# Patient Record
Sex: MALE | Race: ASIAN | NOT HISPANIC OR LATINO | Employment: FULL TIME | URBAN - METROPOLITAN AREA
[De-identification: names, ages, dates, MRNs, and addresses within clinical notes are randomized per-mention and may not be internally consistent; named-entity substitution may affect disease eponyms.]

---

## 2018-12-26 ENCOUNTER — OFFICE VISIT (OUTPATIENT)
Dept: URGENT CARE | Facility: CLINIC | Age: 23
End: 2018-12-26
Payer: COMMERCIAL

## 2018-12-26 VITALS
SYSTOLIC BLOOD PRESSURE: 132 MMHG | HEART RATE: 110 BPM | WEIGHT: 148 LBS | HEIGHT: 71 IN | DIASTOLIC BLOOD PRESSURE: 78 MMHG | TEMPERATURE: 98 F | OXYGEN SATURATION: 97 % | RESPIRATION RATE: 16 BRPM | BODY MASS INDEX: 20.72 KG/M2

## 2018-12-26 DIAGNOSIS — S99.921A INJURY OF RIGHT FOOT, INITIAL ENCOUNTER: Primary | ICD-10-CM

## 2018-12-26 DIAGNOSIS — S92.344A CLOSED NONDISPLACED FRACTURE OF FOURTH METATARSAL BONE OF RIGHT FOOT, INITIAL ENCOUNTER: ICD-10-CM

## 2018-12-26 DIAGNOSIS — S92.334A CLOSED NONDISPLACED FRACTURE OF THIRD METATARSAL BONE OF RIGHT FOOT, INITIAL ENCOUNTER: ICD-10-CM

## 2018-12-26 PROCEDURE — 73630 X-RAY EXAM OF FOOT: CPT | Mod: FY,RT,S$GLB, | Performed by: RADIOLOGY

## 2018-12-26 PROCEDURE — 3008F BODY MASS INDEX DOCD: CPT | Mod: CPTII,S$GLB,, | Performed by: NURSE PRACTITIONER

## 2018-12-26 PROCEDURE — 99204 OFFICE O/P NEW MOD 45 MIN: CPT | Mod: S$GLB,,, | Performed by: NURSE PRACTITIONER

## 2018-12-26 NOTE — PROGRESS NOTES
"Subjective:       Patient ID: Unique De Santiago is a 23 y.o. male.    Vitals:  height is 5' 11" (1.803 m) and weight is 67.1 kg (148 lb). His tympanic temperature is 98.3 °F (36.8 °C). His blood pressure is 132/78 and his pulse is 110. His respiration is 16 and oxygen saturation is 97%.     Chief Complaint: Foot Injury    Foot Injury    The incident occurred 3 to 5 days ago. The incident occurred in the street. The injury mechanism was a fall. The pain is present in the right foot. The quality of the pain is described as aching. The pain is at a severity of 8/10. The pain is severe. The pain has been constant since onset. Associated symptoms include an inability to bear weight. Pertinent negatives include no loss of motion, loss of sensation, muscle weakness, numbness or tingling. He reports no foreign bodies present. The symptoms are aggravated by movement, palpation and weight bearing. He has tried ice for the symptoms.       Constitution: Negative for fatigue.   HENT: Negative for facial swelling and facial trauma.    Neck: Negative for neck stiffness.   Cardiovascular: Negative for chest trauma.   Eyes: Negative for eye trauma, double vision and blurred vision.   Gastrointestinal: Negative for abdominal trauma, abdominal pain and rectal bleeding.   Genitourinary: Negative for hematuria, genital trauma and pelvic pain.   Musculoskeletal: Positive for pain, trauma, joint pain and joint swelling. Negative for abnormal ROM of joint and pain with walking.   Skin: Negative for color change, wound, abrasion and laceration.   Neurological: Negative for dizziness, history of vertigo, light-headedness, coordination disturbances, altered mental status, loss of consciousness and numbness.   Hematologic/Lymphatic: Negative for history of bleeding disorder.   Psychiatric/Behavioral: Negative for altered mental status.       Objective:      Physical Exam   Constitutional: He is oriented to person, place, and time. Vital signs " are normal. He appears well-developed and well-nourished. He is cooperative.  Non-toxic appearance. He does not have a sickly appearance. He does not appear ill. No distress.   HENT:   Head: Normocephalic and atraumatic.   Right Ear: Hearing, external ear and ear canal normal. Tympanic membrane is not bulging.   Left Ear: Hearing, external ear and ear canal normal. Tympanic membrane is not bulging.   Nose: Nose normal. No mucosal edema, rhinorrhea or nasal deformity. No epistaxis. Right sinus exhibits no maxillary sinus tenderness and no frontal sinus tenderness. Left sinus exhibits no maxillary sinus tenderness and no frontal sinus tenderness.   Mouth/Throat: Uvula is midline, oropharynx is clear and moist and mucous membranes are normal. No trismus in the jaw. Normal dentition. No uvula swelling. No posterior oropharyngeal erythema.   Eyes: Conjunctivae, EOM and lids are normal. Pupils are equal, round, and reactive to light. Right eye exhibits no discharge. Left eye exhibits no discharge. No scleral icterus.   Sclera clear bilat   Neck: Trachea normal, normal range of motion, full passive range of motion without pain and phonation normal. Neck supple.   Cardiovascular: Normal rate, regular rhythm, S1 normal, S2 normal, normal heart sounds, intact distal pulses and normal pulses.   Pulmonary/Chest: Effort normal and breath sounds normal. No respiratory distress. He has no decreased breath sounds. He has no wheezes. He has no rhonchi. He has no rales.   Abdominal: Soft. Normal appearance and bowel sounds are normal. He exhibits no distension, no pulsatile midline mass and no mass. There is no tenderness.   Musculoskeletal: He exhibits no edema or deformity.        Right foot: There is decreased range of motion (Decreased ROM secondary to pain and swelling ), tenderness, bony tenderness and swelling. There is normal capillary refill, no crepitus, no deformity and no laceration.        Feet:    Patient has limp to  the right side of the affected foot; See attached picture   Neurological: He is alert and oriented to person, place, and time. He exhibits normal muscle tone. Coordination normal.   Skin: Skin is warm, dry and intact. No rash noted. He is not diaphoretic. No pallor.   Psychiatric: He has a normal mood and affect. His speech is normal and behavior is normal. Judgment and thought content normal. Cognition and memory are normal.   Nursing note and vitals reviewed.      Type of Interpretation: Radiology Verbal Report.  Radiology Procedure Done: Right Foot.  Interpretation: XR FOOT COMPLETE 3 VIEW RIGHT   Order: [id number]   Status:  Final result   Visible to patient:  No (Not Released)   Next appt:  None   Dx:  Injury of right foot, initial encounter   Details       Reading Physician Reading Date Result Priority  Clovis Damon MD 12/26/2018     Narrative    EXAMINATION:  XR FOOT COMPLETE 3 VIEW RIGHT    CLINICAL HISTORY:  . Unspecified injury of right foot, initial encounter    TECHNIQUE:  AP, lateral, and oblique views of the right foot were performed.    COMPARISON:  None    FINDINGS:  The proximal ends of the 3rd and 4th metatarsals show recent fractures without significant displacement.  Tarsal-metatarsal articulations look aligned.  No dislocation or other fracture is detected.  Joint spaces are normal.  Soft tissue swelling is present in the midfoot.    Impression      Right foot 3rd and 4th metatarsal fractures    This report was flagged in Epic as abnormal.      Electronically signed by: Clovis Damon MD  Date: 12/26/2018  Time: 14:41                  Assessment:       1. Injury of right foot, initial encounter    2. Closed nondisplaced fracture of third metatarsal bone of right foot, initial encounter    3. Closed nondisplaced fracture of fourth metatarsal bone of right foot, initial encounter        Plan:         Injury of right foot, initial encounter  -     XR FOOT COMPLETE 3 VIEW RIGHT; Future;  Expected date: 12/26/2018    Closed nondisplaced fracture of third metatarsal bone of right foot, initial encounter  -     AIR CAST WALKER BOOT FOR HOME USE  -     Ambulatory referral to Orthopedics    Closed nondisplaced fracture of fourth metatarsal bone of right foot, initial encounter  -     AIR CAST WALKER BOOT FOR HOME USE  -     Ambulatory referral to Orthopedics      Patient Instructions                                 Orthopedic Fracture Discharge Instructions  If your condition worsens or fails to improve we recommend that you receive another evaluation at the ER immediately or contact your PCP to discuss your concerns or return here. You must understand that you've received an urgent care treatment only and that you may be released before all your medical problems are known or treated. You the patient will arrange for follouw care as instructed.   Follow up with your Orthopedist within the next 48-72hrs for more evaluation and management of your condition.    If you were prescribed a narcotic or muscle relaxant do not drive or operate heavy machinery while taking these medications   Tylenol can also be used as directed for pain unless you have an allergy to them or medical condition such as stomach ulcers, kidney or liver disease or blood thinners etc for which you should not be taking these type of medications.   Avoid NSAIDs like Ibuprofen, Aleve, Advil, Motrin, etc.; as they can cause a delay in healing of the bone.    RICE which means rest, ice compression and elevation are helpful.   If you were given a splint wear it at all times.   If you were given crutches use them as we instructed. Do not rest your armpits on the foam pad or you risk compressing the nerves and the vessels there   You may take over the counter Calcium 1000 mg daily and Vitamin D3 600 IU daily for 4-6 weeks to help with the bone healing process. If you are not allergic or do not have any contraindications.     General Referral to  [facility]  You were referred to [facility] to Establish Care and Management of your condition.  Please call [phone] to schedule your appointment.    Please return here or go to the Emergency Department for any concerns or worsening of condition.  Please follow up with your primary care doctor or specialist in the next 48-72hrs as needed.    If you  smoke, please stop smoking.      Foot Fracture  You have a broken bone (fracture) in your foot. This will cause pain, swelling, and often bruising. It will usually take about 4 to 8 weeks to heal. A foot fracture may be treated with a special shoe, splint, cast, or boot.  Home care  Follow these guidelines when caring for yourself at home:  · You may be given a splint, cast, shoe, or boot to keep the injured area from moving. Unless you were told otherwise, use crutches or a walker. Dont put weight on the injured foot until your health care provider says you can do so. (You can rent crutches and a walker at many pharmacies and surgical or orthopedic supply stores.) Dont put weight on a splint, or it will break.  · Keep your leg elevated to reduce pain and swelling. When sleeping, put a pillow under the injured leg. When sitting, support the injured leg so it is above your waist. This is very important during the first 2 days (48 hours).  · Put an ice pack on the injured area. Do this for 20 minutes every 1 to 2 hours the first day for pain relief. You can make an ice pack by wrapping a plastic bag of ice cubes in a thin towel. As the ice melts, be careful that the splint, cast, boot, or shoe doesnt get wet. You can place the ice pack directly over the splint or cast. Unless told otherwise, you can open the boot or shoe to apply the ice pack. Continue using the ice pack 3 to 4 times a day for the next 2 days. Then use the ice pack as needed to ease pain and swelling.  · Keep the splint, cast, boot, or shoe dry. When bathing, protect it with a  large plastic bag, rubber-banded at the top end. If a fiberglass splint or cast or boot gets wet, you can dry it with a hair dryer. Unless told otherwise, you can take off the boot or shoe to bathe.  · You may use acetaminophen or ibuprofen to control pain, unless another pain medicine was prescribed. If you have chronic liver or kidney disease, talk with your healthcare provider before using these medicines. Also talk with your provider if youve had a stomach ulcer or gastrointestinal bleeding.  · Dont put creams or objects under the cast if you have itching.  Follow-up care  Follow up with your healthcare provider, or as advised. This is to make sure the bone is healing the way it should. If you were given a splint, it may be changed to a cast or boot at your follow-up visit.  X-rays may be taken. You will be told of any new findings that may affect your care.  When to seek medical advice  Call your healthcare provider right away if any of these occur:  · The cast or splint cracks  · The plaster cast or splint becomes wet or soft  · The fiberglass cast or splint stays wet for more than 24 hours  · Bad odor from the cast or wound fluid stains the cast  · Tightness or pain under the cast or splint gets worse  · Toes become swollen, cold, blue, numb, or tingly  · You cant move your toes  · Skin around cast or splint becomes red  · Fever of 100.4ºF (38ºC) or higher, or as directed by your healthcare provider  Date Last Reviewed: 2/1/2017 © 2000-2017 [employer]. [address] [place] [address], [place] [address]. All rights reserved. This information is not intended as a substitute for professional medical care. Always follow your healthcare professional's instructions.

## 2018-12-26 NOTE — PATIENT INSTRUCTIONS
Orthopedic Fracture Discharge Instructions  If your condition worsens or fails to improve we recommend that you receive another evaluation at the ER immediately or contact your PCP to discuss your concerns or return here. You must understand that you've received an urgent care treatment only and that you may be released before all your medical problems are known or treated. You the patient will arrange for follouwp care as instructed.   Follow up with your Orthopedist within the next 48-72hrs for more evaluation and management of your condition.    If you were prescribed a narcotic or muscle relaxant do not drive or operate heavy machinery while taking these medications   Tylenol can also be used as directed for pain unless you have an allergy to them or medical condition such as stomach ulcers, kidney or liver disease or blood thinners etc for which you should not be taking these type of medications.   Avoid NSAIDs like Ibuprofen, Aleve, Advil, Motrin, etc.; as they can cause a delay in healing of the bone.    RICE which means rest, ice compression and elevation are helpful.   If you were given a splint wear it at all times.   If you were given crutches use them as we instructed. Do not rest your armpits on the foam pad or you risk compressing the nerves and the vessels there   You may take over the counter Calcium 1000 mg daily and Vitamin D3 600 IU daily for 4-6 weeks to help with the bone healing process. If you are not allergic or do not have any contraindications.     General Referral to [facility]  You were referred to [facility] to Establish Care and Management of your condition.  Please call [phone] to schedule your appointment.    Please return here or go to the Emergency Department for any concerns or worsening of condition.  Please follow up with your primary care doctor or specialist in the next 48-72hrs as needed.    If you  smoke, please stop  smoking.      Foot Fracture  You have a broken bone (fracture) in your foot. This will cause pain, swelling, and often bruising. It will usually take about 4 to 8 weeks to heal. A foot fracture may be treated with a special shoe, splint, cast, or boot.  Home care  Follow these guidelines when caring for yourself at home:  · You may be given a splint, cast, shoe, or boot to keep the injured area from moving. Unless you were told otherwise, use crutches or a walker. Dont put weight on the injured foot until your health care provider says you can do so. (You can rent crutches and a walker at many pharmacies and surgical or orthopedic supply stores.) Dont put weight on a splint, or it will break.  · Keep your leg elevated to reduce pain and swelling. When sleeping, put a pillow under the injured leg. When sitting, support the injured leg so it is above your waist. This is very important during the first 2 days (48 hours).  · Put an ice pack on the injured area. Do this for 20 minutes every 1 to 2 hours the first day for pain relief. You can make an ice pack by wrapping a plastic bag of ice cubes in a thin towel. As the ice melts, be careful that the splint, cast, boot, or shoe doesnt get wet. You can place the ice pack directly over the splint or cast. Unless told otherwise, you can open the boot or shoe to apply the ice pack. Continue using the ice pack 3 to 4 times a day for the next 2 days. Then use the ice pack as needed to ease pain and swelling.  · Keep the splint, cast, boot, or shoe dry. When bathing, protect it with a large plastic bag, rubber-banded at the top end. If a fiberglass splint or cast or boot gets wet, you can dry it with a hair dryer. Unless told otherwise, you can take off the boot or shoe to bathe.  · You may use acetaminophen or ibuprofen to control pain, unless another pain medicine was prescribed. If you have chronic liver or kidney disease, talk with your healthcare provider before using  these medicines. Also talk with your provider if youve had a stomach ulcer or gastrointestinal bleeding.  · Dont put creams or objects under the cast if you have itching.  Follow-up care  Follow up with your healthcare provider, or as advised. This is to make sure the bone is healing the way it should. If you were given a splint, it may be changed to a cast or boot at your follow-up visit.  X-rays may be taken. You will be told of any new findings that may affect your care.  When to seek medical advice  Call your healthcare provider right away if any of these occur:  · The cast or splint cracks  · The plaster cast or splint becomes wet or soft  · The fiberglass cast or splint stays wet for more than 24 hours  · Bad odor from the cast or wound fluid stains the cast  · Tightness or pain under the cast or splint gets worse  · Toes become swollen, cold, blue, numb, or tingly  · You cant move your toes  · Skin around cast or splint becomes red  · Fever of 100.4ºF (38ºC) or higher, or as directed by your healthcare provider  Date Last Reviewed: 2/1/2017  © 2137-6975 [employer]. [address] [place] [address], [place] [address]. All rights reserved. This information is not intended as a substitute for professional medical care. Always follow your healthcare professional's instructions.

## 2018-12-27 ENCOUNTER — TELEPHONE (OUTPATIENT)
Dept: PODIATRY | Facility: CLINIC | Age: 23
End: 2018-12-27

## 2018-12-27 NOTE — TELEPHONE ENCOUNTER
----- Message from Rochelle Gaxiola sent at 12/27/2018 12:54 PM CST -----  Contact: Self  Ms. Vianney, the pt was seen in the ER for two fractured toes and referred to Ortho.  I was unable to find availability for anytime soon, so I'm hoping you will be successful.  He is requesting a returned call for scheduling.    He can be a reached at [phone].    Thank you

## 2018-12-27 NOTE — TELEPHONE ENCOUNTER
Ortho Telephone Triage Message  [phone]  Pt requests Ortho appt for R foot fracture seen per ANA Balderrama NP/[facility]  referral. Pt will be returning to [facility] on 1/1/19.  HX: Closed nondisplaced fracture of R 3rd/4th metatarsal 12/26/18  Triage Advice: Advised pt that appt is available tomorrow in Podiatry. Advised that pt follow  AVS until seen.   Resolution:Pt states understanding. Appt scheduled tomorrow  with Dr. Graham/Podiatry at 10:15am with arrival at 10:00am. Pt confirms time and location of appt.

## 2018-12-28 ENCOUNTER — OFFICE VISIT (OUTPATIENT)
Dept: PODIATRY | Facility: CLINIC | Age: 23
End: 2018-12-28
Payer: COMMERCIAL

## 2018-12-28 VITALS
BODY MASS INDEX: 20.72 KG/M2 | HEIGHT: 71 IN | WEIGHT: 148 LBS | DIASTOLIC BLOOD PRESSURE: 86 MMHG | SYSTOLIC BLOOD PRESSURE: 135 MMHG | HEART RATE: 101 BPM

## 2018-12-28 DIAGNOSIS — M79.671 FOOT PAIN, RIGHT: ICD-10-CM

## 2018-12-28 DIAGNOSIS — S93.621A LISFRANC'S SPRAIN, RIGHT, INITIAL ENCOUNTER: ICD-10-CM

## 2018-12-28 DIAGNOSIS — M25.474 SWELLING OF FOOT JOINT, RIGHT: ICD-10-CM

## 2018-12-28 DIAGNOSIS — S92.344A CLOSED NONDISPLACED FRACTURE OF FOURTH METATARSAL BONE OF RIGHT FOOT, INITIAL ENCOUNTER: Primary | ICD-10-CM

## 2018-12-28 DIAGNOSIS — S92.334A CLOSED NONDISPLACED FRACTURE OF THIRD METATARSAL BONE OF RIGHT FOOT, INITIAL ENCOUNTER: ICD-10-CM

## 2018-12-28 PROCEDURE — 3008F BODY MASS INDEX DOCD: CPT | Mod: CPTII,S$GLB,, | Performed by: PODIATRIST

## 2018-12-28 PROCEDURE — 99999 PR PBB SHADOW E&M-EST. PATIENT-LVL III: CPT | Mod: PBBFAC,,, | Performed by: PODIATRIST

## 2018-12-28 PROCEDURE — 99203 OFFICE O/P NEW LOW 30 MIN: CPT | Mod: S$GLB,,, | Performed by: PODIATRIST

## 2018-12-28 NOTE — LETTER
December 28, 2018      Gemma Balderrama, NP  [address] [facility] [address]           [facility]  [address]  [place] [address]  Phone: [phone]          Patient: Unique De Santiago   MR Number: [id number]   YOB: 1995   Date of Visit: 12/28/2018       Dear Gemma Balderrama:    Thank you for referring Unique De Santiago to me for evaluation. Attached you will find relevant portions of my assessment and plan of care.    If you have questions, please do not hesitate to call me. I look forward to following Unique De Santiago along with you.    Sincerely,    Kathie Graham DPM    Enclosure  CC:  No Recipients    If you would like to receive this communication electronically, please contact [email or web address] or [phone] to request more information on [employer] access.    For providers and/or their staff who would like to refer a patient to [facility], please contact us through our one-stop-shop provider referral line, [facility], at [phone].    If you feel you have received this communication in error or would no longer like to receive these types of communications, please e-mail [email or web address]